# Patient Record
(demographics unavailable — no encounter records)

---

## 2025-01-07 NOTE — HISTORY OF PRESENT ILLNESS
[FreeTextEntry1] : Case of 63 yo woman w/ PMH: - HTN - Hyperactive airway disease  Presenting for follow up [de-identified] : Interval Hx: - Patient complaining of headache and nausea intermittent in the AM of 6 day duration. Patient took tylenol with no relief. She also reported fever, generalized body aches, and fever. - Patient denied vomiting, sore throat, or rhinorrhea. - ROS pertinent for dry cough, inability to clear sputum, and shoulder muscular pain. - No recent sick contacts.

## 2025-01-07 NOTE — PHYSICAL EXAM
[No Acute Distress] : no acute distress [Well Nourished] : well nourished [Well Developed] : well developed [Well-Appearing] : well-appearing [Normal Outer Ear/Nose] : the outer ears and nose were normal in appearance [No Respiratory Distress] : no respiratory distress  [No Accessory Muscle Use] : no accessory muscle use [Clear to Auscultation] : lungs were clear to auscultation bilaterally [Normal Rate] : normal rate  [Regular Rhythm] : with a regular rhythm [Normal S1, S2] : normal S1 and S2 [No Murmur] : no murmur heard [No Edema] : there was no peripheral edema [de-identified] : left fronal and left maxillay sinus tenderness. Erythematous oropharynx with tonsillar swelling

## 2025-01-07 NOTE — HISTORY OF PRESENT ILLNESS
[FreeTextEntry1] : Case of 65 yo woman w/ PMH: - HTN - Hyperactive airway disease  Presenting for follow up [de-identified] : Interval Hx: - Patient complaining of headache and nausea intermittent in the AM of 6 day duration. Patient took tylenol with no relief. She also reported fever, generalized body aches, and fever. - Patient denied vomiting, sore throat, or rhinorrhea. - ROS pertinent for dry cough, inability to clear sputum, and shoulder muscular pain. - No recent sick contacts.

## 2025-01-07 NOTE — REVIEW OF SYSTEMS
[Fever] : fever [Fatigue] : fatigue [Earache] : earache [Headache] : headache [Chills] : no chills [Night Sweats] : no night sweats [Hearing Loss] : no hearing loss [Hoarseness] : no hoarseness [Nasal Discharge] : no nasal discharge [Sore Throat] : no sore throat [Postnasal Drip] : no postnasal drip [Chest Pain] : no chest pain [Palpitations] : no palpitations [Lower Ext Edema] : no lower extremity edema [Orthopnea] : no orthopnea [Paroxysmal Nocturnal Dyspnea] : no paroxysmal nocturnal dyspnea [Shortness Of Breath] : no shortness of breath [Wheezing] : no wheezing [Cough] : no cough [Dyspnea on Exertion] : no dyspnea on exertion [Abdominal Pain] : no abdominal pain [Nausea] : no nausea [Constipation] : no constipation [Diarrhea] : diarrhea [Vomiting] : no vomiting [Heartburn] : no heartburn [Dysuria] : no dysuria [Frequency] : no frequency [Dizziness] : no dizziness [Fainting] : no fainting [Unsteady Walking] : no ataxia [Insomnia] : no insomnia

## 2025-01-07 NOTE — PLAN
[FreeTextEntry1] : #Headache #Acute bacterial sinusitis - No red flags on physical exam (no motor weakness, paresthesia, seizures, syncope/pre-syncope) - Patient reported bilateral earache, no signs of AOM on exam - Erythematous oropharynx noted - Since symptoms persistent since 6 days with left maxillary and frontal sinus tenderness, will treat as acute bacterial sinusitis with Augmentin 1 g BID for 7 days counselled patient on how/when to take medication and about side effects - Mucinex prn  Bacterial sinusitis; Wash hands; do not share cups or utensils May return to school or work 24 hours after starting antibiotics and last fever. Take acetaminophen or ibuprofen for muscle aches, fever, chills Salt water gargles for sore throat Take prescribed antibiotics as directed until gone  Return to office if you experience the following:  blood in urine headache, loss of appetite, nausea/vomiting unusual edema fever  #HTN - BP today 144/86 - Patient educated on target BP range - Counseled on exercise and lifestyle modifications - Educated on signs and symptoms of hypo/hypertension  #Anxiety - controlled - C/w prn hydroxyzine  #Hyperreactive airway dx - stable - C/w prn rescue inhaler  #HCM - Breast cancer: BI-RADS 1 (2023): Due for mammography - Cervical cancer: -ve HPV/PAP test in 2021 - Colon cancer: colonoscopy 2022 showed tubular adenoma, f/u with GI - Lung cancer: patient is a non-smoker

## 2025-01-07 NOTE — END OF VISIT
[] : Resident [FreeTextEntry3] : I saw the patient, examined the patient independently, reviewed medical record, and provided the medical services. Agree with resident note as personally edited above. symptoms worsening over past 2 days, sinus tenderness on L on exam. suspect HA's are from sinus congestion but patient was also referred to neuro from neurosx empiric tx as above

## 2025-01-07 NOTE — PHYSICAL EXAM
[No Acute Distress] : no acute distress [Well Nourished] : well nourished [Well Developed] : well developed [Well-Appearing] : well-appearing [Normal Outer Ear/Nose] : the outer ears and nose were normal in appearance [No Respiratory Distress] : no respiratory distress  [No Accessory Muscle Use] : no accessory muscle use [Clear to Auscultation] : lungs were clear to auscultation bilaterally [Normal Rate] : normal rate  [Regular Rhythm] : with a regular rhythm [Normal S1, S2] : normal S1 and S2 [No Murmur] : no murmur heard [No Edema] : there was no peripheral edema [de-identified] : left fronal and left maxillay sinus tenderness. Erythematous oropharynx with tonsillar swelling

## 2025-03-28 NOTE — ASSESSMENT
[FreeTextEntry1] : Ms. DUVALL is a 63yo F presenting for neurosurgical follow-up with regards to spinal meningioma. MRI C-spine with and without contrast, 3/16/2025 demonstrates stable dural-based enhancing lesion arising from the left aspect of the cervicomedullary junction measuring 5 x 8 mm. Patient doing well. No acute concerns.  PLAN: MRI C-spine with and without contrast in 6 months    Bridget Allred MS FNP-BC Nurse Practitioner [facility]- [facility]  Tiago Vegas MD  Department of Neurosurgery [employer] [facility] / [facility]

## 2025-03-28 NOTE — HISTORY OF PRESENT ILLNESS
[FreeTextEntry1] : Ms. DUVALL is a 63yo F presenting for neurosurgical follow-up. She was last seen in September 2024.  As a review, patient first presented to our office in June posthospital discharge for an incidental finding on MRI during a headache workup. 6/9/2024 MRI cervical spine revealed a subcentimeter intradural extramedullary lesion in the left cervicomedullary junction abutting the left aspect of the cord, likely representing a meningioma.   MRI C-spine with and without contrast, 3/16/2025 demonstrates stable dural-based enhancing lesion arising from the left aspect of the cervicomedullary junction measuring 5 x 8 mm.  Patient denies any one-sided weakness, visual disturbances, dizziness, or headaches. She reports mild intermittent cervical paraspinal tenderness with intermittent radiation to her shoulders bilaterally with increased stress, none currently. She is ambulating steadily without any assistive devices.    Physical Exam: Constitutional: Well appearing, no distress HEENT: Normocephalic Atraumatic Psychiatric: Awake, alert, oriented to person, place, situation and time. Normal affect. Follows commands. Language: Speech is clear, fluent with good repetition & comprehension. No dysarthria. Pulmonary: No respiratory distress  Neurologic: CN II-XII grossly intact; EOMI with no nystagmus. No facial asymmetry bilaterally, full eye closure strength bilaterally. Hearing grossly normal. Head turning & shoulder shrug intact, bilaterally. Tongue midline, normal movements, no atrophy. Smile symmetrical. Palpation: None Strength: Full strength in all major muscle groups Sensation: Full sensation to light touch in all extremities, V1-V3 sensation bilaterally intact. Motor: No pronator drift, muscle strength of bilateral UE and bilateral LE: 5/5. Normal muscle tone. Reflexes: DTR of biceps, knee and ankle normal 2+ biceps 2+ triceps 2+ patellar No Clonus No Chapa's No Romberg's ROM intact Straight-Leg Raise Test Left: Negative Straight-Leg Raise Test Right: Negative Gait: No postural instability. Normal stance and walking without assistance.

## 2025-03-28 NOTE — END OF VISIT
[FreeTextEntry3] : I have independently evaluated and examined this patient, have reviewed available imaging and have supervised and agree with the Advanced Clinical Practice provider and their history and physical and assessment and plan above. [Time Spent: ___ minutes] : I have spent [unfilled] minutes of time on the encounter which excludes teaching and separately reported services.

## 2025-04-25 NOTE — ASSESSMENT
[FreeTextEntry1] :  62 y/o FM w/ PMhx of pre-dm, hyperactive airway disease, lactose intolerance, meningioma, subclinical hyperthyroidism presents for surveillance colonoscopy. pt had last colonoscopy 4 years ago and polyps were removed.  denies any constipation, weight loss or red blood per rectum. no family hx of gastric or colon cancer.    #Encounter for F/U Colonoscopy # Lactose intolerance   - Not blood thinners  - Last colo 2021: Polyp (7 mm) in the ascending colon. (Polypectomy).  Internal and external hemorrhoids.   - Colonoscopy prep with Miralax BID for 10dyas followed by Golytely and Dulcolax (1 day prep) and provided clear instructions - Biopsy: Tubular adenoma   - Risks and benefits discussed with patient  - will schedule colonoscopy - F/U PRN

## 2025-04-25 NOTE — PHYSICAL EXAM
[General Appearance - Alert] : alert [General Appearance - In No Acute Distress] : in no acute distress [Auscultation Breath Sounds / Voice Sounds] : lungs were clear to auscultation bilaterally [Heart Rate And Rhythm] : heart rate was normal and rhythm regular [Heart Sounds] : normal S1 and S2 [Heart Sounds Gallop] : no gallops [Murmurs] : no murmurs [Heart Sounds Pericardial Friction Rub] : no pericardial rub [Bowel Sounds] : normal bowel sounds [Abdomen Soft] : soft [Abdomen Tenderness] : non-tender [Abdomen Mass (___ Cm)] : no abdominal mass palpated [No CVA Tenderness] : no ~M costovertebral angle tenderness [No Spinal Tenderness] : no spinal tenderness [Abnormal Walk] : normal gait [Nail Clubbing] : no clubbing  or cyanosis of the fingernails [Musculoskeletal - Swelling] : no joint swelling seen [Motor Tone] : muscle strength and tone were normal [Skin Color & Pigmentation] : normal skin color and pigmentation [Skin Turgor] : normal skin turgor [] : no rash not assessed; pt received OOB in chair

## 2025-04-25 NOTE — REVIEW OF SYSTEMS
[Negative] : Heme/Lymph [Fever] : no fever [Chills] : no chills [Heart Rate Is Slow] : the heart rate was not slow [Heart Rate Is Fast] : the heart rate was not fast [Shortness Of Breath] : no shortness of breath [Abdominal Pain] : no abdominal pain [Vomiting] : no vomiting [Constipation] : no constipation [Diarrhea] : no diarrhea [Bleeding] : no bleeding [Bloating (gassiness)] : no bloating

## 2025-04-25 NOTE — HISTORY OF PRESENT ILLNESS
[de-identified] : 64 y/o FM w/ PMhx of pre-dm, hyperactive airway disease, lactose intolerance, meningioma, subclinical hyperthyroidism presents for surveillance colonoscopy. pt had last colonoscopy 4 years ago and polyps were removed.  denies any constipation, weight loss or red blood per rectum. no family hx of gastric or colon cancer.  [FreeTextEntry1] : Polyp (7 mm) in the ascending colon. (Polypectomy).   Internal and external hemorrhoids.  Plan: Repeat Colonoscopy with CF scope in 1 year in view of prep.  Await pathology results. F/U in GI office with the next available appointment.